# Patient Record
Sex: FEMALE | Race: WHITE | NOT HISPANIC OR LATINO | URBAN - METROPOLITAN AREA
[De-identification: names, ages, dates, MRNs, and addresses within clinical notes are randomized per-mention and may not be internally consistent; named-entity substitution may affect disease eponyms.]

---

## 2024-02-16 ENCOUNTER — APPOINTMENT (OUTPATIENT)
Dept: CARDIOLOGY | Facility: HOSPITAL | Age: 59
End: 2024-02-16
Payer: COMMERCIAL

## 2024-02-16 ENCOUNTER — HOSPITAL ENCOUNTER (EMERGENCY)
Facility: HOSPITAL | Age: 59
Discharge: HOME | End: 2024-02-16
Attending: EMERGENCY MEDICINE
Payer: COMMERCIAL

## 2024-02-16 VITALS
BODY MASS INDEX: 38.98 KG/M2 | OXYGEN SATURATION: 96 % | WEIGHT: 220.02 LBS | TEMPERATURE: 98.1 F | RESPIRATION RATE: 11 BRPM | DIASTOLIC BLOOD PRESSURE: 90 MMHG | HEART RATE: 55 BPM | SYSTOLIC BLOOD PRESSURE: 160 MMHG | HEIGHT: 63 IN

## 2024-02-16 DIAGNOSIS — R07.9 CHEST PAIN, UNSPECIFIED TYPE: Primary | ICD-10-CM

## 2024-02-16 DIAGNOSIS — I10 HYPERTENSION, UNSPECIFIED TYPE: ICD-10-CM

## 2024-02-16 LAB
ALBUMIN SERPL-MCNC: 3.9 G/DL (ref 3.5–5)
ALP BLD-CCNC: 153 U/L (ref 35–125)
ALT SERPL-CCNC: 17 U/L (ref 5–40)
ANION GAP SERPL CALC-SCNC: 8 MMOL/L
AST SERPL-CCNC: 14 U/L (ref 5–40)
BASOPHILS # BLD AUTO: 0.04 X10*3/UL (ref 0–0.1)
BASOPHILS NFR BLD AUTO: 0.7 %
BILIRUB SERPL-MCNC: <0.2 MG/DL (ref 0.1–1.2)
BUN SERPL-MCNC: 12 MG/DL (ref 8–25)
CALCIUM SERPL-MCNC: 9.1 MG/DL (ref 8.5–10.4)
CHLORIDE SERPL-SCNC: 107 MMOL/L (ref 97–107)
CK SERPL-CCNC: 58 U/L (ref 24–195)
CO2 SERPL-SCNC: 25 MMOL/L (ref 24–31)
CREAT SERPL-MCNC: 0.7 MG/DL (ref 0.4–1.6)
EGFRCR SERPLBLD CKD-EPI 2021: >90 ML/MIN/1.73M*2
EOSINOPHIL # BLD AUTO: 0.2 X10*3/UL (ref 0–0.7)
EOSINOPHIL NFR BLD AUTO: 3.6 %
ERYTHROCYTE [DISTWIDTH] IN BLOOD BY AUTOMATED COUNT: 11.9 % (ref 11.5–14.5)
FLUAV RNA RESP QL NAA+PROBE: NOT DETECTED
FLUBV RNA RESP QL NAA+PROBE: NOT DETECTED
GLUCOSE SERPL-MCNC: 112 MG/DL (ref 65–99)
HCT VFR BLD AUTO: 39.7 % (ref 36–46)
HGB BLD-MCNC: 13.4 G/DL (ref 12–16)
IMM GRANULOCYTES # BLD AUTO: 0.01 X10*3/UL (ref 0–0.7)
IMM GRANULOCYTES NFR BLD AUTO: 0.2 % (ref 0–0.9)
LIPASE SERPL-CCNC: 28 U/L (ref 16–63)
LYMPHOCYTES # BLD AUTO: 2.53 X10*3/UL (ref 1.2–4.8)
LYMPHOCYTES NFR BLD AUTO: 45.3 %
MCH RBC QN AUTO: 31.1 PG (ref 26–34)
MCHC RBC AUTO-ENTMCNC: 33.8 G/DL (ref 32–36)
MCV RBC AUTO: 92 FL (ref 80–100)
MONOCYTES # BLD AUTO: 0.44 X10*3/UL (ref 0.1–1)
MONOCYTES NFR BLD AUTO: 7.9 %
NEUTROPHILS # BLD AUTO: 2.36 X10*3/UL (ref 1.2–7.7)
NEUTROPHILS NFR BLD AUTO: 42.3 %
NRBC BLD-RTO: 0 /100 WBCS (ref 0–0)
PLATELET # BLD AUTO: 215 X10*3/UL (ref 150–450)
POTASSIUM SERPL-SCNC: 4.1 MMOL/L (ref 3.4–5.1)
PROT SERPL-MCNC: 6.8 G/DL (ref 5.9–7.9)
RBC # BLD AUTO: 4.31 X10*6/UL (ref 4–5.2)
SARS-COV-2 RNA RESP QL NAA+PROBE: NOT DETECTED
SODIUM SERPL-SCNC: 140 MMOL/L (ref 133–145)
TROPONIN T SERPL-MCNC: 7 NG/L
TROPONIN T SERPL-MCNC: 7 NG/L
WBC # BLD AUTO: 5.6 X10*3/UL (ref 4.4–11.3)

## 2024-02-16 PROCEDURE — 85025 COMPLETE CBC W/AUTO DIFF WBC: CPT | Performed by: EMERGENCY MEDICINE

## 2024-02-16 PROCEDURE — 82550 ASSAY OF CK (CPK): CPT | Performed by: EMERGENCY MEDICINE

## 2024-02-16 PROCEDURE — 93005 ELECTROCARDIOGRAM TRACING: CPT

## 2024-02-16 PROCEDURE — 83690 ASSAY OF LIPASE: CPT | Performed by: EMERGENCY MEDICINE

## 2024-02-16 PROCEDURE — 36415 COLL VENOUS BLD VENIPUNCTURE: CPT | Performed by: EMERGENCY MEDICINE

## 2024-02-16 PROCEDURE — 87636 SARSCOV2 & INF A&B AMP PRB: CPT | Performed by: EMERGENCY MEDICINE

## 2024-02-16 PROCEDURE — 80053 COMPREHEN METABOLIC PANEL: CPT | Performed by: EMERGENCY MEDICINE

## 2024-02-16 PROCEDURE — 99284 EMERGENCY DEPT VISIT MOD MDM: CPT | Mod: 25 | Performed by: EMERGENCY MEDICINE

## 2024-02-16 PROCEDURE — 84484 ASSAY OF TROPONIN QUANT: CPT | Performed by: EMERGENCY MEDICINE

## 2024-02-16 PROCEDURE — 99283 EMERGENCY DEPT VISIT LOW MDM: CPT | Mod: 25

## 2024-02-16 ASSESSMENT — PAIN DESCRIPTION - PROGRESSION: CLINICAL_PROGRESSION: NOT CHANGED

## 2024-02-16 ASSESSMENT — HEART SCORE
ECG: NORMAL
RISK FACTORS: 1-2 RISK FACTORS
HEART SCORE: 2
AGE: 45-64
TROPONIN: LESS THAN OR EQUAL TO NORMAL LIMIT
HISTORY: SLIGHTLY SUSPICIOUS

## 2024-02-16 ASSESSMENT — PAIN DESCRIPTION - ONSET: ONSET: SUDDEN

## 2024-02-16 ASSESSMENT — PAIN DESCRIPTION - DESCRIPTORS
DESCRIPTORS: ACHING;POUNDING;PRESSURE
DESCRIPTORS: PRESSURE
DESCRIPTORS_2: PRESSURE;TIGHTNESS

## 2024-02-16 ASSESSMENT — COLUMBIA-SUICIDE SEVERITY RATING SCALE - C-SSRS
1. IN THE PAST MONTH, HAVE YOU WISHED YOU WERE DEAD OR WISHED YOU COULD GO TO SLEEP AND NOT WAKE UP?: NO
6. HAVE YOU EVER DONE ANYTHING, STARTED TO DO ANYTHING, OR PREPARED TO DO ANYTHING TO END YOUR LIFE?: NO
2. HAVE YOU ACTUALLY HAD ANY THOUGHTS OF KILLING YOURSELF?: NO

## 2024-02-16 ASSESSMENT — PAIN DESCRIPTION - LOCATION
LOCATION: HEAD
LOCATION_2: CHEST

## 2024-02-16 ASSESSMENT — PAIN SCALES - GENERAL
PAINLEVEL_OUTOF10: 4
PAINLEVEL_OUTOF10: 9

## 2024-02-16 ASSESSMENT — PAIN - FUNCTIONAL ASSESSMENT: PAIN_FUNCTIONAL_ASSESSMENT: 0-10

## 2024-02-16 ASSESSMENT — PAIN DESCRIPTION - PAIN TYPE: TYPE: ACUTE PAIN

## 2024-02-16 ASSESSMENT — PAIN DESCRIPTION - FREQUENCY: FREQUENCY: CONSTANT/CONTINUOUS

## 2024-02-16 NOTE — DISCHARGE INSTRUCTIONS
Follow-up with your primary care physician as soon as possible.  Return to the ER immediately for new or worsening symptoms

## 2024-02-16 NOTE — ED PROVIDER NOTES
HPI   Chief Complaint   Patient presents with    Chest Pain     Pt has been achy for the last couple days. Pt had a doctor appt yesterday and her BP was high. Pt was laying in bed at home and couldn't sleep. Pt started to have some chest tightness that radiates to her left arm as well as nausea , headache, and back pain.       HPI       66-year-old female with high blood pressure and left shoulder and arm pain.  She states she had a hard time sleeping all night.  Then she felt like her heart was pounding in her chest and developed a headache.  She checked her blood pressure and it was 190/100.  She lie down to rest and that she got better.  Then about 1/2-hour later she felt aching in her left shoulder down to her left arm.  She reports that she has had diffuse achiness for multiple weeks.  Earlier yesterday she went to see her PCP who diagnosed her with spondylolisthesis.  No new medications.  Headache has improved.  No pain across the chest.  No shortness of breath.  She does smoke.  No prior cardiac procedures.  Does have family history.             No data recorded HEART Score: 2                   Patient History   Past Medical History:   Diagnosis Date    Acute upper respiratory infection, unspecified 10/01/2018    Acute URI    Encounter for gynecological examination (general) (routine) without abnormal findings 04/03/2017    Well female exam with routine gynecological exam    Encounter for gynecological examination (general) (routine) without abnormal findings     Encounter for routine pelvic examination    Other conditions influencing health status     MRI Head FLAIR    Other conditions influencing health status     Doppler US Of Artery: Carotid    Personal history of other diseases of the nervous system and sense organs 12/08/2017    History of acute otitis media    Personal history of other diseases of the nervous system and sense organs 10/02/2018    History of acute otitis externa    Personal history of  other medical treatment 2017    History of screening mammography    Unspecified convulsions (CMS/HCC)     Seizures    Varicella without complication     Varicella without complication     Past Surgical History:   Procedure Laterality Date     SECTION, CLASSIC  2013     Section    CHOLECYSTECTOMY  2013    Cholecystectomy    TOTAL HIP ARTHROPLASTY  2015    Total Hip Replacement    TUBAL LIGATION  04/10/2015    Tubal Ligation     No family history on file.  Social History     Tobacco Use    Smoking status: Not on file    Smokeless tobacco: Not on file   Substance Use Topics    Alcohol use: Not on file    Drug use: Not on file       Physical Exam   ED Triage Vitals [24 0217]   Temperature Heart Rate Respirations BP   36.7 °C (98.1 °F) 60 16 (!) 156/92      Pulse Ox Temp Source Heart Rate Source Patient Position   95 % Temporal Monitor Sitting      BP Location FiO2 (%)     Right arm --       Physical Exam    Gen:  Well nourished, no acute distress  Head: Normocephalic, atraumatic  Eyes: PERRL, EOMI, conjunctiva clear  ENT: External ears and nose normal, OP clear, Mucosa moist  Neck: Supple, no tenderness  Chest: No tenderness, no crepitus  CV: Regular rate and rhythm, no Murmur  Lungs: Clear bilaterally, no distress  Abd: No tenderness, no rebound or guarding  Extremities: FROM, no edema  Neuro: Cranial nerves intact, moving all 4 extremities, A&O x 4  Psych: Appropriate behavior and affect  Back: No focal tenderness, no CVA tenderness  Skin: No rashes or lesions noted    ED Course & MDM     EKG was ordered by me and interpreted by me.  Normal sinus rhythm rate of 61.  Normal TN QT intervals.  Normal QRS.  No acute ST or T wave changes  ED Course as of 24 0604    The following diagnostic tests were ordered by me and interpreted by me.  Flu and COVID-negative.  Initial troponin 7 which is negative.  Initial CK 58.  Lipase negative.  CBC within  normal notes.  Chemistry panel, LFTs within norm limits. [AK]      ED Course User Index  [AK] Kenrick Herrera MD         Diagnoses as of 02/16/24 0604   Chest pain, unspecified type   Hypertension, unspecified type   Patient presented with some pain in the shoulder and arm.  It sounds like she was up all night possibly due to the spondylolysis was recent diagnosed with.  Then she felt like her heart was pounding checked her blood pressure was high.  She checked it again and it was still elevated.  Then she felt soreness in her shoulder so she came to the ER for evaluation.  She has no prior cardiac disease.  Heart score is a 2 indicating a low risk.  Clinically this does not sound cardiac in nature.  I like she was up either due to anxiety or being uncomfortable then her blood pressure was elevated.  It sounds like these joint pains have been chronic for some time.  Has had no difficulties exertion.  She resting comfortably and pain-free here.  Plan to repeat troponin and if negative can discharge home      Repeat troponin returns negative.  She otherwise is stable.  No signs of acute pulmonary embolism, pneumothorax, pneumonia or acute cord syndrome.  Plan discharge home and follow-up with PCP  Medical Decision Making      Procedure  Procedures     Kenrick Herrera MD  02/16/24 0604

## 2024-04-01 LAB
ATRIAL RATE: 61 BPM
P AXIS: 49 DEGREES
P OFFSET: 196 MS
P ONSET: 139 MS
PR INTERVAL: 164 MS
Q ONSET: 221 MS
QRS COUNT: 10 BEATS
QRS DURATION: 82 MS
QT INTERVAL: 414 MS
QTC CALCULATION(BAZETT): 416 MS
QTC FREDERICIA: 416 MS
R AXIS: 52 DEGREES
T AXIS: 59 DEGREES
T OFFSET: 428 MS
VENTRICULAR RATE: 61 BPM

## 2024-05-10 ENCOUNTER — OFFICE VISIT (OUTPATIENT)
Dept: NEUROLOGY | Facility: CLINIC | Age: 59
End: 2024-05-10
Payer: COMMERCIAL

## 2024-05-10 DIAGNOSIS — R56.9 SEIZURE (MULTI): Primary | ICD-10-CM

## 2024-05-10 PROCEDURE — 99214 OFFICE O/P EST MOD 30 MIN: CPT | Performed by: PSYCHIATRY & NEUROLOGY

## 2024-05-10 RX ORDER — DIVALPROEX SODIUM 500 MG/1
500 TABLET, FILM COATED, EXTENDED RELEASE ORAL DAILY
COMMUNITY
End: 2024-05-10 | Stop reason: SDUPTHER

## 2024-05-10 RX ORDER — MULTIVITAMIN WITH IRON
1 TABLET ORAL DAILY
COMMUNITY
Start: 2022-05-12

## 2024-05-10 RX ORDER — CELECOXIB 200 MG/1
200 CAPSULE ORAL
COMMUNITY

## 2024-05-10 RX ORDER — DIVALPROEX SODIUM 500 MG/1
500 TABLET, FILM COATED, EXTENDED RELEASE ORAL DAILY
Qty: 30 TABLET | Refills: 11 | Status: SHIPPED | OUTPATIENT
Start: 2024-05-10

## 2024-05-10 RX ORDER — LOSARTAN POTASSIUM AND HYDROCHLOROTHIAZIDE 12.5; 1 MG/1; MG/1
1 TABLET ORAL DAILY
COMMUNITY

## 2024-05-10 RX ORDER — VIT C/E/ZN/COPPR/LUTEIN/ZEAXAN 250MG-90MG
25 CAPSULE ORAL DAILY
COMMUNITY

## 2024-05-10 RX ORDER — TRAZODONE HYDROCHLORIDE 50 MG/1
50 TABLET ORAL NIGHTLY
COMMUNITY

## 2024-05-10 RX ORDER — METHOCARBAMOL 500 MG/1
500 TABLET, FILM COATED ORAL DAILY PRN
COMMUNITY

## 2024-05-10 RX ORDER — LANOLIN ALCOHOL/MO/W.PET/CERES
1000 CREAM (GRAM) TOPICAL EVERY 24 HOURS
COMMUNITY

## 2024-05-10 NOTE — PROGRESS NOTES
Chief complaint:    Seizure disorder    History of Present Illness:   Bk has had a good year. She is sleeping more and better with trazodone. She is good about using CPAP at night. She has decreased pepsi consumption from at least 6 cans a day to two cans, before 2 pm. She is compliant with Depakote and has not had a seizure in years. 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 She has lost a few pounds.      Physical examination:  She is a pleasant, healthy-appearing woman     Neurologic Exam     Mental Status   Her speech was clear, fluent and appropriate.      Cranial Nerves     CN II   Visual fields full to confrontation.     CN III, IV, VI   Extraocular motions are normal.     CN VII   Facial expression full, symmetric.     Motor Exam     Strength   Right iliopsoas: 5/5  Left iliopsoas: 5/5  Right anterior tibial: 5/5  Left anterior tibial: 5/5  No abnormal movements.  Finger tapping was brisk bilaterally.     Sensory Exam   Light touch normal.     Gait, Coordination, and Reflexes     Reflexes   Right brachioradialis: 1+  Left brachioradialis: 1+  Right biceps: 1+  Left biceps: 1+  Right triceps: 1+  Left triceps: 1+  Right patellar: 2+  Left patellar: 2+  Right achilles: 0  Left achilles: 0  Right plantar: normal  Left plantar: normal  She limped slightly on her right side due to hip pain after hip replacement but she also had a hint of steppage gait on the left. She was able to walk on her heels or toes and perform a tandem gait cautiously.          1. Seizure (Multi)  divalproex (Depakote ER) 500 mg 24 hr tablet             No orders of the defined types were placed in this encounter.         Impression and Plan:  Mayuri continues to remain seizure-free on Depakote  mg a day. Her last seizure was over 5 years ago. She was  unable to tolerate a higher dose of Depakote due to sedation. Recent CMP and CBC with diff were  reviewed and were notable for elevated glucose. She has cut back on Pepsi consumption, and i encouraged her to cut back further and walk on her days off for exercise.     She will ask Dr. Crabtree if he can continue to prescribe Depakote  mg a day after I retire at the end of September. If not, she will consult a local  neurologist on a list I gave her.     Janine Patton MD

## 2024-05-10 NOTE — PATIENT INSTRUCTIONS
You continue to do well on EXTENDED RELEASE Depakote (ER) 500 mg a day. Please continue this medication to prevent seizures. Your recent blood work looked fine although your sugar is mildly elevated, putting you at ailin risk for diabetes. Please continue to cut back on Pepsi and continue to work on losing weight. I will retire at the end of September, so please ask Dr. Crabtree if he will refill Depakote ER in the future. If not please consult a local neurologist.

## 2024-12-30 ENCOUNTER — HOSPITAL ENCOUNTER (OUTPATIENT)
Dept: RADIOLOGY | Facility: HOSPITAL | Age: 59
Discharge: HOME | End: 2024-12-30
Payer: COMMERCIAL

## 2024-12-30 VITALS — BODY MASS INDEX: 38.98 KG/M2 | HEIGHT: 63 IN | WEIGHT: 220 LBS

## 2024-12-30 DIAGNOSIS — Z12.31 SCREENING MAMMOGRAM FOR BREAST CANCER: ICD-10-CM

## 2024-12-30 PROBLEM — D12.6 TUBULAR ADENOMA OF COLON: Status: ACTIVE | Noted: 2024-12-30

## 2024-12-30 PROBLEM — N89.8 VAGINAL DRYNESS: Status: ACTIVE | Noted: 2024-12-30

## 2024-12-30 PROBLEM — H10.30 CONJUNCTIVITIS, ACUTE: Status: RESOLVED | Noted: 2024-12-30 | Resolved: 2024-12-30

## 2024-12-30 PROBLEM — J11.1 INFLUENZA-LIKE ILLNESS: Status: RESOLVED | Noted: 2024-12-30 | Resolved: 2024-12-30

## 2024-12-30 PROBLEM — Z78.0 POSTMENOPAUSAL STATE: Status: ACTIVE | Noted: 2024-12-30

## 2024-12-30 PROBLEM — E66.9 OBESITY: Status: ACTIVE | Noted: 2024-12-30

## 2024-12-30 PROBLEM — M19.90 ARTHRITIS: Status: ACTIVE | Noted: 2024-12-30

## 2024-12-30 PROBLEM — N94.10 DYSPAREUNIA IN FEMALE: Status: ACTIVE | Noted: 2024-12-30

## 2024-12-30 PROBLEM — M16.10 HIP ARTHRITIS: Status: ACTIVE | Noted: 2024-12-30

## 2024-12-30 PROBLEM — I10 BENIGN ESSENTIAL HYPERTENSION: Status: ACTIVE | Noted: 2024-12-30

## 2024-12-30 PROBLEM — J34.0 NASAL ABSCESS: Status: ACTIVE | Noted: 2024-12-30

## 2024-12-30 PROBLEM — H60.90 OTITIS EXTERNA: Status: RESOLVED | Noted: 2024-12-30 | Resolved: 2024-12-30

## 2024-12-30 PROBLEM — G47.00 INSOMNIA: Status: ACTIVE | Noted: 2024-12-30

## 2024-12-30 PROBLEM — B01.9 VARICELLA: Status: RESOLVED | Noted: 2024-12-30 | Resolved: 2024-12-30

## 2024-12-30 PROBLEM — L70.9 ACNE: Status: ACTIVE | Noted: 2024-12-30

## 2024-12-30 PROBLEM — R56.9 GENERALIZED CONVULSIVE SEIZURE (MULTI): Status: ACTIVE | Noted: 2024-12-30

## 2024-12-30 PROBLEM — J01.00 ACUTE MAXILLARY SINUSITIS: Status: RESOLVED | Noted: 2024-12-30 | Resolved: 2024-12-30

## 2024-12-30 PROBLEM — H60.339 SWIMMER'S EAR: Status: RESOLVED | Noted: 2024-12-30 | Resolved: 2024-12-30

## 2024-12-30 PROBLEM — I82.90 VENOUS THROMBOSIS: Status: RESOLVED | Noted: 2024-12-30 | Resolved: 2024-12-30

## 2024-12-30 PROBLEM — S63.91XA SPRAIN OF UNSPECIFIED PART OF RIGHT WRIST AND HAND, INITIAL ENCOUNTER: Status: RESOLVED | Noted: 2022-01-26 | Resolved: 2024-12-30

## 2024-12-30 PROBLEM — R56.9 SEIZURE (MULTI): Status: ACTIVE | Noted: 2024-12-30

## 2024-12-30 PROCEDURE — 77067 SCR MAMMO BI INCL CAD: CPT

## 2024-12-30 PROCEDURE — 77063 BREAST TOMOSYNTHESIS BI: CPT | Performed by: RADIOLOGY

## 2024-12-30 PROCEDURE — 77067 SCR MAMMO BI INCL CAD: CPT | Performed by: RADIOLOGY

## 2025-01-03 ENCOUNTER — APPOINTMENT (OUTPATIENT)
Dept: PRIMARY CARE | Facility: CLINIC | Age: 60
End: 2025-01-03
Payer: COMMERCIAL

## 2025-01-07 ENCOUNTER — APPOINTMENT (OUTPATIENT)
Dept: PRIMARY CARE | Facility: CLINIC | Age: 60
End: 2025-01-07
Payer: COMMERCIAL

## 2025-01-10 ENCOUNTER — APPOINTMENT (OUTPATIENT)
Dept: PRIMARY CARE | Facility: CLINIC | Age: 60
End: 2025-01-10
Payer: COMMERCIAL

## 2025-01-14 ENCOUNTER — OFFICE VISIT (OUTPATIENT)
Dept: PRIMARY CARE | Facility: CLINIC | Age: 60
End: 2025-01-14
Payer: COMMERCIAL

## 2025-01-14 VITALS
TEMPERATURE: 97.8 F | HEIGHT: 63 IN | BODY MASS INDEX: 39.87 KG/M2 | DIASTOLIC BLOOD PRESSURE: 68 MMHG | WEIGHT: 225 LBS | OXYGEN SATURATION: 97 % | HEART RATE: 58 BPM | SYSTOLIC BLOOD PRESSURE: 105 MMHG

## 2025-01-14 DIAGNOSIS — Z76.89 ENCOUNTER TO ESTABLISH CARE: Primary | ICD-10-CM

## 2025-01-14 DIAGNOSIS — R56.9 SEIZURE (MULTI): ICD-10-CM

## 2025-01-14 DIAGNOSIS — E66.9 OBESITY (BMI 30-39.9): ICD-10-CM

## 2025-01-14 DIAGNOSIS — G47.30 SLEEP APNEA, UNSPECIFIED TYPE: ICD-10-CM

## 2025-01-14 DIAGNOSIS — R56.9 GENERALIZED CONVULSIVE SEIZURE (MULTI): ICD-10-CM

## 2025-01-14 DIAGNOSIS — D12.6 TUBULAR ADENOMA OF COLON: ICD-10-CM

## 2025-01-14 DIAGNOSIS — M19.90 ARTHRITIS: ICD-10-CM

## 2025-01-14 PROBLEM — L70.9 ACNE: Status: RESOLVED | Noted: 2024-12-30 | Resolved: 2025-01-14

## 2025-01-14 PROBLEM — J34.0 NASAL ABSCESS: Status: RESOLVED | Noted: 2024-12-30 | Resolved: 2025-01-14

## 2025-01-14 PROBLEM — N89.8 VAGINAL DRYNESS: Status: RESOLVED | Noted: 2024-12-30 | Resolved: 2025-01-14

## 2025-01-14 PROCEDURE — 3008F BODY MASS INDEX DOCD: CPT | Performed by: NURSE PRACTITIONER

## 2025-01-14 PROCEDURE — 3078F DIAST BP <80 MM HG: CPT | Performed by: NURSE PRACTITIONER

## 2025-01-14 PROCEDURE — 99204 OFFICE O/P NEW MOD 45 MIN: CPT | Performed by: NURSE PRACTITIONER

## 2025-01-14 PROCEDURE — 1036F TOBACCO NON-USER: CPT | Performed by: NURSE PRACTITIONER

## 2025-01-14 PROCEDURE — 3074F SYST BP LT 130 MM HG: CPT | Performed by: NURSE PRACTITIONER

## 2025-01-14 RX ORDER — ACETAMINOPHEN 325 MG/1
TABLET ORAL EVERY 6 HOURS PRN
COMMUNITY

## 2025-01-14 RX ORDER — METHOCARBAMOL 500 MG/1
500 TABLET, FILM COATED ORAL DAILY PRN
Qty: 30 TABLET | Refills: 2 | Status: SHIPPED | OUTPATIENT
Start: 2025-01-14

## 2025-01-14 ASSESSMENT — ENCOUNTER SYMPTOMS
ARTHRALGIAS: 1
CONSTITUTIONAL NEGATIVE: 1
LOSS OF SENSATION IN FEET: 0
DEPRESSION: 0
GASTROINTESTINAL NEGATIVE: 1
CARDIOVASCULAR NEGATIVE: 1
PSYCHIATRIC NEGATIVE: 1
OCCASIONAL FEELINGS OF UNSTEADINESS: 0
RESPIRATORY NEGATIVE: 1

## 2025-01-14 ASSESSMENT — PAIN SCALES - GENERAL: PAINLEVEL_OUTOF10: 1

## 2025-01-14 ASSESSMENT — PATIENT HEALTH QUESTIONNAIRE - PHQ9
1. LITTLE INTEREST OR PLEASURE IN DOING THINGS: NOT AT ALL
SUM OF ALL RESPONSES TO PHQ9 QUESTIONS 1 AND 2: 0
2. FEELING DOWN, DEPRESSED OR HOPELESS: NOT AT ALL

## 2025-01-14 NOTE — ASSESSMENT & PLAN NOTE
Managed by neurology  Last seen on 5/10/2024 Dr. Janine quesada retired  Managed with depakot 500mg daily  Last seizure 6-7 years ago

## 2025-01-14 NOTE — ASSESSMENT & PLAN NOTE
Managed with robaxin 500mg prn  Managed with celebrex 200mg daily    Orders:    methocarbamol (Robaxin) 500 mg tablet; Take 1 tablet (500 mg) by mouth once daily as needed for muscle spasms.

## 2025-01-14 NOTE — PROGRESS NOTES
[facility]  PROGRESS NOTE      Mayuri Cervantes is a 59 y.o. female that is presenting today to establish care with new provider.  She last saw a provider in June Dr. Crabtree who has retired.  She does need a refill on robaxin.  Would like a referral to sleep medicine has VEENA on cpap and needs new supplies    Assessment/Plan   Assessment & Plan  Encounter to establish care  Medications and problem list reconciled today with the patient    CARE GAPS  Colonoscopy 4/19/2019 repeat in 5 years  Mammogram 12/30/2024  Declined flu and covid vaccines       Tubular adenoma of colon  Last colonoscopy 4/19/2019 repeat in 5 years   Needs to schedule    Arthritis  Managed with robaxin 500mg prn  Managed with celebrex 200mg daily    Orders:    methocarbamol (Robaxin) 500 mg tablet; Take 1 tablet (500 mg) by mouth once daily as needed for muscle spasms.    Seizure (Multi)  Managed by neurology  Last seen on 5/10/2024 Dr. Janine quesada retired  Managed with depakot 500mg daily  Last seizure 6-7 years ago    Obesity (BMI 30-39.9)    Orders:    Lipid Panel; Future    Hemoglobin A1C; Future    Thyroid Stimulating Hormone; Future    Sleep apnea, unspecified type    Orders:    Referral to Adult Sleep Medicine; Future      Subjective   HPI  Review of Systems   Constitutional: Negative.    Respiratory: Negative.     Cardiovascular: Negative.    Gastrointestinal: Negative.    Genitourinary: Negative.    Musculoskeletal:  Positive for arthralgias.   Skin: Negative.    Psychiatric/Behavioral: Negative.        Objective   Vitals:    01/14/25 1106   BP: 105/68   Pulse: 58   Temp: 36.6 °C (97.8 °F)   SpO2: 97%      Body mass index is 39.86 kg/m².  Physical Exam  Vitals reviewed.   Constitutional:       Appearance: Normal appearance.   Cardiovascular:      Rate and Rhythm: Normal rate and regular rhythm.      Heart sounds: Normal heart sounds.   Pulmonary:      Effort: Pulmonary effort is normal.      Breath  "sounds: Normal breath sounds.   Abdominal:      General: Bowel sounds are normal.      Palpations: Abdomen is soft.   Skin:     General: Skin is warm and dry.   Neurological:      General: No focal deficit present.      Mental Status: She is alert and oriented to person, place, and time.   Psychiatric:         Mood and Affect: Mood normal.         Behavior: Behavior normal.         Thought Content: Thought content normal.         Judgment: Judgment normal.       Vitals:    01/14/25 1106   BP: 105/68   Pulse: 58   Temp: 36.6 °C (97.8 °F)   SpO2: 97%       Diagnostic Results   Lab Results   Component Value Date    GLUCOSE 112 (H) 02/16/2024    CALCIUM 9.1 02/16/2024     02/16/2024    K 4.1 02/16/2024    CO2 25 02/16/2024     02/16/2024    BUN 12 02/16/2024    CREATININE 0.70 02/16/2024     Lab Results   Component Value Date    ALT 17 02/16/2024    AST 14 02/16/2024    ALKPHOS 153 (H) 02/16/2024    BILITOT <0.2 02/16/2024     Lab Results   Component Value Date    WBC 5.6 02/16/2024    HGB 13.4 02/16/2024    HCT 39.7 02/16/2024    MCV 92 02/16/2024     02/16/2024     Lab Results   Component Value Date    CHOL 178 12/03/2018     Lab Results   Component Value Date    HDL 46.7 12/03/2018     No results found for: \"LDLCALC\"  Lab Results   Component Value Date    TRIG 139 12/03/2018     No components found for: \"CHOLHDL\"  No results found for: \"HGBA1C\"  Other labs not included in the list above were reviewed either before or during this encounter.    History    Past Medical History:   Diagnosis Date    Acne 12/30/2024    Acute maxillary sinusitis 12/30/2024    Acute upper respiratory infection, unspecified 10/01/2018    Acute URI    Conjunctivitis, acute 12/30/2024    Encounter for gynecological examination (general) (routine) without abnormal findings 04/03/2017    Well female exam with routine gynecological exam    Encounter for gynecological examination (general) (routine) without abnormal findings     " Encounter for routine pelvic examination    Hypertension     Influenza-like illness 2024    Nasal abscess 2024    Other conditions influencing health status     MRI Head FLAIR    Other conditions influencing health status     Doppler US Of Artery: Carotid    Personal history of other diseases of the nervous system and sense organs 2017    History of acute otitis media    Personal history of other diseases of the nervous system and sense organs 10/02/2018    History of acute otitis externa    Personal history of other medical treatment 2017    History of screening mammography    Sprain of unspecified part of right wrist and hand, initial encounter 2022    Swimmer's ear 2024    Unspecified convulsions (Multi)     Seizures    Vaginal dryness 2024    Varicella 2024    Varicella without complication     Varicella without complication    Venous thrombosis 2024     Past Surgical History:   Procedure Laterality Date     SECTION, CLASSIC  2013     Section    CHOLECYSTECTOMY  2013    Cholecystectomy    TOTAL HIP ARTHROPLASTY  2015    Total Hip Replacement    TUBAL LIGATION  04/10/2015    Tubal Ligation     Family History   Problem Relation Name Age of Onset    Lung cancer Mother      Diabetes Mother      Heart disease Father      Breast cancer Maternal Grandmother       Social History     Socioeconomic History    Marital status:      Spouse name: Not on file    Number of children: Not on file    Years of education: Not on file    Highest education level: Not on file   Occupational History    Not on file   Tobacco Use    Smoking status: Former     Current packs/day: 0.00     Types: Cigarettes     Quit date:      Years since quitting: 10.0     Passive exposure: Past    Smokeless tobacco: Never   Vaping Use    Vaping status: Some Days    Substances: Nicotine    Devices: Disposable    Passive vaping exposure: Yes   Substance and  Sexual Activity    Alcohol use: Yes     Comment: occasional    Drug use: Never    Sexual activity: Not on file   Other Topics Concern    Not on file   Social History Narrative    Not on file     Social Drivers of Health     Financial Resource Strain: Not on file   Food Insecurity: Not on file   Transportation Needs: Not on file   Physical Activity: Not on file   Stress: Not on file   Social Connections: Not on file   Intimate Partner Violence: Not on file   Housing Stability: Not on file     No Known Allergies  Current Outpatient Medications on File Prior to Visit   Medication Sig Dispense Refill    acetaminophen (TylenoL) 325 mg tablet Take by mouth every 6 hours if needed for mild pain (1 - 3).      celecoxib (CeleBREX) 200 mg capsule Take 1 capsule (200 mg) by mouth once daily with breakfast.      cholecalciferol (Vitamin D3) 25 MCG (1000 UT) capsule Take 1 capsule (25 mcg) by mouth once daily.      divalproex (Depakote ER) 500 mg 24 hr tablet Take 1 tablet (500 mg) by mouth once daily. 30 tablet 11    losartan-hydrochlorothiazide (Hyzaar) 100-12.5 mg tablet Take 1 tablet by mouth once daily.      traZODone (Desyrel) 50 mg tablet Take 1 tablet (50 mg) by mouth once daily at bedtime.      [DISCONTINUED] methocarbamol (Robaxin) 500 mg tablet Take 1 tablet (500 mg) by mouth once daily as needed.       No current facility-administered medications on file prior to visit.     Immunization History   Administered Date(s) Administered    Flu vaccine, trivalent, preservative free, age 6 months and greater (Fluarix/Fluzone/Flulaval) 09/04/2020, 10/13/2021    Influenza, injectable, MDCK, quadrivalent 10/16/2019    Moderna SARS-CoV-2 Vaccination 01/13/2021, 02/12/2021, 01/18/2022    Tdap vaccine, age 7 year and older (BOOSTRIX, ADACEL) 07/16/2012, 08/18/2018     Patient's medical history was reviewed and updated either before or during this encounter.       Tracy Galeano, APRN-CNP

## 2025-02-26 ENCOUNTER — APPOINTMENT (OUTPATIENT)
Dept: PODIATRY | Facility: CLINIC | Age: 60
End: 2025-02-26
Payer: COMMERCIAL

## 2025-02-26 ENCOUNTER — OFFICE VISIT (OUTPATIENT)
Dept: SLEEP MEDICINE | Facility: CLINIC | Age: 60
End: 2025-02-26
Payer: COMMERCIAL

## 2025-02-26 VITALS
HEIGHT: 63 IN | HEART RATE: 96 BPM | WEIGHT: 220 LBS | BODY MASS INDEX: 38.98 KG/M2 | SYSTOLIC BLOOD PRESSURE: 106 MMHG | DIASTOLIC BLOOD PRESSURE: 70 MMHG | OXYGEN SATURATION: 99 %

## 2025-02-26 DIAGNOSIS — I10 BENIGN ESSENTIAL HYPERTENSION: Primary | ICD-10-CM

## 2025-02-26 DIAGNOSIS — G47.30 SLEEP APNEA, UNSPECIFIED TYPE: ICD-10-CM

## 2025-02-26 DIAGNOSIS — F51.01 PRIMARY INSOMNIA: ICD-10-CM

## 2025-02-26 DIAGNOSIS — G47.19 EXCESSIVE DAYTIME SLEEPINESS: ICD-10-CM

## 2025-02-26 DIAGNOSIS — G47.21 CIRCADIAN RHYTHM SLEEP DISORDER, DELAYED SLEEP PHASE TYPE: ICD-10-CM

## 2025-02-26 PROCEDURE — 3074F SYST BP LT 130 MM HG: CPT | Performed by: PHYSICIAN ASSISTANT

## 2025-02-26 PROCEDURE — 1036F TOBACCO NON-USER: CPT | Performed by: PHYSICIAN ASSISTANT

## 2025-02-26 PROCEDURE — G2211 COMPLEX E/M VISIT ADD ON: HCPCS | Performed by: PHYSICIAN ASSISTANT

## 2025-02-26 PROCEDURE — 3078F DIAST BP <80 MM HG: CPT | Performed by: PHYSICIAN ASSISTANT

## 2025-02-26 PROCEDURE — 3008F BODY MASS INDEX DOCD: CPT | Performed by: PHYSICIAN ASSISTANT

## 2025-02-26 PROCEDURE — 99204 OFFICE O/P NEW MOD 45 MIN: CPT | Performed by: PHYSICIAN ASSISTANT

## 2025-02-26 ASSESSMENT — SLEEP AND FATIGUE QUESTIONNAIRES
ESS-CHAD TOTAL SCORE: 5
HOW LIKELY ARE YOU TO NOD OFF OR FALL ASLEEP WHILE SITTING AND TALKING TO SOMEONE: WOULD NEVER DOZE
HOW LIKELY ARE YOU TO NOD OFF OR FALL ASLEEP WHILE WATCHING TV: MODERATE CHANCE OF DOZING
HOW LIKELY ARE YOU TO NOD OFF OR FALL ASLEEP WHILE LYING DOWN TO REST IN THE AFTERNOON WHEN CIRCUMSTANCES PERMIT: WOULD NEVER DOZE
HOW LIKELY ARE YOU TO NOD OFF OR FALL ASLEEP WHEN YOU ARE A PASSENGER IN A CAR FOR AN HOUR WITHOUT A BREAK: SLIGHT CHANCE OF DOZING
HOW LIKELY ARE YOU TO NOD OFF OR FALL ASLEEP WHILE SITTING QUIETLY AFTER LUNCH WITHOUT ALCOHOL: WOULD NEVER DOZE
SITING INACTIVE IN A PUBLIC PLACE LIKE A CLASS ROOM OR A MOVIE THEATER: WOULD NEVER DOZE
HOW LIKELY ARE YOU TO NOD OFF OR FALL ASLEEP IN A CAR, WHILE STOPPED FOR A FEW MINUTES IN TRAFFIC: WOULD NEVER DOZE
HOW LIKELY ARE YOU TO NOD OFF OR FALL ASLEEP WHILE SITTING AND READING: MODERATE CHANCE OF DOZING

## 2025-02-26 ASSESSMENT — PAIN SCALES - GENERAL: PAINLEVEL_OUTOF10: 0-NO PAIN

## 2025-02-26 NOTE — PROGRESS NOTES
[facility]  New Visit Note        HISTORY OF PRESENT ILLNESS     The patient's referring provider is: Tracy Galeano, BRE-CNP    HISTORY OF PRESENT ILLNESS   Mayuri Cervantes is a 59 y.o. female who presents to a [facility] for a sleep medicine evaluation with concerns of Snoring, Unrefreshing Sleep, Excessive Daytime Sleepiness, Sleep Apnea, and Pap Adherence Followup.     PAST SLEEP HISTORY    Patient has the following sleep-related diagnoses and sleep study results: 2021 sleep study was consistent with VEENA - she thinks severe with AHI of 80?    CURRENT HISTORY    She states she does well overall with CPAP.  Recently has been feeling more tired on CPAP.    Uses P30i mask and likes it.  Pressure feels low  Has some dry mouth, not using heated tube currently    Takes trazodone to help her fall asleep.  This does work well and she denies any side effects.  She did take melatonin previously but went up to a very high dose and did not find it helped.  She does feel she is a bit of a night owl although currently falls asleep early with her schedule.    PAP Adherence:  Durable Medical Equipment Company: [employer]  Pressure Range: 10 cm H2O Mask: p30i    A PAP adherence download was obtained and data was reviewed personally today in clinic. (see scanned document in EPIC)  % days >4 hours use: 97  Average use : 7 hr 36 min  95% leak : 41 L/min  AHI: 1.9     SLEEP RELATED-ROS:    SLEEP SCHEDULE WEEKDAYS/WORKDAYS  - Usual Bedtime: 9:30 PM  - Falls asleep around: 10:00 PM  - Wake time: 6:00 AM    SLEEP SCHEDULE WEEKENDS/NON-WORKDAYS  - Usual Bedtime: 11:00 PM  - Falls asleep around: 11:30 PM  - Wake time: 7:00 AM    NAPS: Does not nap.    AVERAGE SLEEP DURATION: 6-7 hours/day    PREFERRED SLEEP POSITION: Side    SLEEP INITIATION: Takes Trazodone to help fall asleep.    SLEEP MAINTENANCE: Wakes up occasionally during the night.    RECREATIONAL DRUG USE    SMOKING: Quit  "smoking in 2021.  ALCOHOL: Drinks alcohol occasionally.  CAFFEINE: Consumes one can of caffeinated beverage per day.  MARIJUANA: Never uses.    ESS: 5      PHYSICAL EXAM     VITAL SIGNS: /70   Pulse 96   Ht 1.6 m (5' 3\")   Wt 99.8 kg (220 lb)   SpO2 99%   BMI 38.97 kg/m²      PREVIOUS WEIGHTS:  Wt Readings from Last 3 Encounters:   02/26/25 99.8 kg (220 lb)   01/14/25 102 kg (225 lb)   12/30/24 99.8 kg (220 lb)       PHYSICAL EXAM: GENERAL: alert oriented x 3 pleasant and cooperative no acute distress  MODIFIED MALLAMPATI SCORE: 2+  LATERAL PHARYNGEAL WALL: 2+  NECK EXAM: normal supple no adenopathy    RESULTS/DATA     No results found for: \"IRON\", \"TRANSFERRIN\", \"IRONSAT\", \"TIBC\", \"FERRITIN\"    ASSESSMENT/PLAN     Ms. Cervantes is a 59 y.o. female and was referred to the [facility] for the following issues:    OBSTRUCTIVE SLEEP APNEA / EDS  -Benefiting from CPAP  -Great compliance, VEENA well controlled per download  -PC 12 cm H2O  -provided sample mask (n30i) to try  -change to heated tubing to enable higher humidity  -supplies ordered    HYPERTENSION  BP Readings from Last 3 Encounters:   02/26/25 106/70   01/14/25 105/68   02/16/24 160/90      -Well controlled with current treatments     BMI>35  -Body mass index is 38.97 kg/m².  today  -With sufficient weight loss may no longer require treatment for VEENA    DELAYED SLEEP PHASE / INSOMNIA  -suspect more delayed phase vs insomnia  -taking trazodone 50-75 mg  -Recommend melatonin 1 mg 4-5 hours prior to intended bedtime  -Try increasing light exposure in the morning and decreasing light exposure in the afternoon     Followup 3 weeks after sleep study to review results   "

## 2025-02-26 NOTE — PATIENT INSTRUCTIONS
Thank you for coming to the Sleep Medicine Clinic today! Your sleep medicine provider today was: Barry Alva PA-C Below is a summary of your treatment plan, other important information, and our contact numbers:      TREATMENT PLAN     Call [phone]), option 3 to schedule your sleep study. When you have an appointment please call us back at [phone] to schedule a followup appointment 3-4 weeks after to review results.    I increased your pressure to 12 cm H2O.    I am giving you an n30i mask to try.    I recommend you start melatonin 1 mg 4-5 hours prior to intended bedtime. Increase the amount of light you get in the morning and avoid light as much as possible in the evening (at least 4 hours before you want to go to bed).     Obstructive Sleep Apnea (VEENA) is a sleep disorder where your upper airway muscles relax during sleep and the airway intermittently and repetitively narrows and collapses leading to partially blocked airway (hypopnea) or completely blocked airway (apnea) which, in turn, can disrupt breathing in sleep, lower oxygen levels while you sleep and cause night time wakings. Because both apnea and hypopnea may cause higher carbon dioxide or low oxygen levels, untreated VEENA can lead to heart arrhythmia, elevation of blood pressure, and make it harder for the body to consolidate memory and facilitate metabolism (leading to higher blood sugars at night). Frequent partial arousals occur during sleep resulting in sleep deprivation and daytime sleepiness. VEENA is associated with an increased risk of cardiovascular disease, stroke, hypertension, and insulin resistance. Moreover, untreated VEENA with excessive daytime sleepiness can increase the risk of motor vehicular accidents.    Some conservative strategies for VEENA regardless of VEENA severity are:   Positional therapy - Avoid sleeping on your back.   Healthy diet and regular exercise to optimize weight is highly encouraged.   Avoid alcohol  late in the evening and sedative-hypnotics as these substances can make sleep apnea worse.   Improve breathing through the nose with intranasal steroid spray, saline rinse, or antihistamines    Safety: Avoid driving vehicle and operating heavy equipment while sleepy. Drowsy driving may lead to life-threatening motor vehicle accidents. A person driving while sleepy is 5 times more likely to have an accident. If you feel sleepy, pull over and take a short power nap (sleep for less than 30 minutes). Otherwise, ask somebody to drive you.    Treatment options for sleep apnea include weight management, positional therapy, Positive Airway Therapy (PAP) therapy, oral appliance therapy, hypoglossal nerve stimulator (Inspire) and select airway surgeries.      OUR SLEEP TESTING LOCATIONS     Our team will contact you to schedule your sleep study, however, you can contact us as follow:  Main Phone Line (scheduling only): [phone]), option 3  Adult and Pediatric Locations  [facility] (6 years and older): [facility] - 4th floor ([address] [facility]) After hours line: [phone]  [facility] (Main campus: All ages): [facility], 6th floor. After hours line: [phone]   [place] (18 years and older): [address] [facility], 2nd floor   [place] (18 years and older): [address] [facility]; 4th floor  After hours line: [phone]  [facility] (18 years and older) at [place]: [address] [facility] [place][[address] 2]  After hours line: [phone]    [place] (5 years and older; younger considered on case-by-case basis): [address]; [address], [address]. Scheduling  After hours line: [phone]   [place] (6 years and older): [address]; [address]; [address]   [place] (6 years and older): [address] [facility] [address]  After hours line: [phone]   [place] (13 years and older) in [place]: [address], 2nd floor   "After hours line: [phone]   [place] (13 year and older): [address]  After hours line: [phone]      IMPORTANT PHONE NUMBERS     Sleep Medicine Clinic Fax: [phone]  Appointments (for Adult Sleep Clinic): [phone] - option 2  Appointments (For Sleep Studies): [phone] - option 3  Behavioral Sleep Medicine: [phone]    [place] ([facility]): [phone]  For clinical questions and refilling prescriptions: [phone]  Lisa Kaplan (For [place]): P: [phone]  F: [phone]       CONTACTING YOUR SLEEP MEDICINE PROVIDER     Send a message directly to your provider through \"My Chart\", which is the email service through your  Records Account: [email or web address]   Call [phone] and leave a message. One of the administrative assistants will forward the message to your sleep medicine provider through \"My Chart\" and/or email.     Your sleep medicine provider for this visit was: Barry Alva PA-C       "

## 2025-03-02 ENCOUNTER — PROCEDURE VISIT (OUTPATIENT)
Dept: SLEEP MEDICINE | Facility: HOSPITAL | Age: 60
End: 2025-03-02
Payer: COMMERCIAL

## 2025-03-02 DIAGNOSIS — G47.30 SLEEP APNEA, UNSPECIFIED TYPE: ICD-10-CM

## 2025-03-02 PROCEDURE — 95806 SLEEP STUDY UNATT&RESP EFFT: CPT | Performed by: PSYCHIATRY & NEUROLOGY

## 2025-03-11 ENCOUNTER — OFFICE VISIT (OUTPATIENT)
Dept: SLEEP MEDICINE | Facility: CLINIC | Age: 60
End: 2025-03-11
Payer: COMMERCIAL

## 2025-03-11 ASSESSMENT — PAIN SCALES - GENERAL: PAINLEVEL_OUTOF10: 0-NO PAIN

## 2025-03-11 NOTE — PROGRESS NOTES
"[facility] Sleep Medicine Clinic  Followup Visit Note    HISTORY OF PRESENT ILLNESS   Mayuri Cervantes is a 59 y.o. female who presents to a [facility] Sleep Medicine Clinic for followup.       Current History    On today's visit, the patient reports ***    PAP Adherence:      Sleep Scales:  ESS: 5     REVIEW OF SYSTEMS    All other systems negative      PHYSICAL EXAM     VITAL SIGNS: There were no vitals taken for this visit.     PREVIOUS WEIGHTS:  Wt Readings from Last 3 Encounters:   02/26/25 99.8 kg (220 lb)   01/14/25 102 kg (225 lb)   12/30/24 99.8 kg (220 lb)       Constitutional: Alert and oriented, cooperative, no obvious distress   HEENT: Non icteric or anemic, EOM WNL bilaterally   Neck: Supple, no JVD, no goiter, no adenopathy, no rigidity  Extremities: No clubbing, no LL edema   Neuromuscular: Cranial nerves grossly intact, no focal deficits     RESULTS/DATA     No results found for: \"IRON\", \"TRANSFERRIN\", \"IRONSAT\", \"TIBC\", \"FERRITIN\"      ASSESSMENT/PLAN     Ms. Cervantes is a 59 y.o. female and returns in followup for the following issues:    ***    "

## 2025-03-13 ENCOUNTER — TELEPHONE (OUTPATIENT)
Dept: SLEEP MEDICINE | Facility: CLINIC | Age: 60
End: 2025-03-13

## 2025-03-13 NOTE — TELEPHONE ENCOUNTER
[place] 313/25@11:36a to schedule a appointment to go over sleep study results with Barry   Holding RN to OR RN Unit RN to OR RN

## 2025-03-21 ENCOUNTER — APPOINTMENT (OUTPATIENT)
Dept: SLEEP MEDICINE | Facility: CLINIC | Age: 60
End: 2025-03-21
Payer: COMMERCIAL

## 2025-03-26 ENCOUNTER — APPOINTMENT (OUTPATIENT)
Dept: SLEEP MEDICINE | Facility: CLINIC | Age: 60
End: 2025-03-26
Payer: COMMERCIAL

## 2025-03-26 VITALS
HEIGHT: 63 IN | HEART RATE: 62 BPM | OXYGEN SATURATION: 93 % | DIASTOLIC BLOOD PRESSURE: 64 MMHG | BODY MASS INDEX: 38.09 KG/M2 | SYSTOLIC BLOOD PRESSURE: 110 MMHG | WEIGHT: 215 LBS

## 2025-03-26 DIAGNOSIS — G47.19 EXCESSIVE DAYTIME SLEEPINESS: ICD-10-CM

## 2025-03-26 DIAGNOSIS — G47.30 SLEEP APNEA, UNSPECIFIED TYPE: ICD-10-CM

## 2025-03-26 DIAGNOSIS — I10 BENIGN ESSENTIAL HYPERTENSION: ICD-10-CM

## 2025-03-26 PROCEDURE — 3074F SYST BP LT 130 MM HG: CPT | Performed by: PHYSICIAN ASSISTANT

## 2025-03-26 PROCEDURE — 1036F TOBACCO NON-USER: CPT | Performed by: PHYSICIAN ASSISTANT

## 2025-03-26 PROCEDURE — 3008F BODY MASS INDEX DOCD: CPT | Performed by: PHYSICIAN ASSISTANT

## 2025-03-26 PROCEDURE — 99214 OFFICE O/P EST MOD 30 MIN: CPT | Performed by: PHYSICIAN ASSISTANT

## 2025-03-26 PROCEDURE — 3078F DIAST BP <80 MM HG: CPT | Performed by: PHYSICIAN ASSISTANT

## 2025-03-26 ASSESSMENT — SLEEP AND FATIGUE QUESTIONNAIRES
HOW LIKELY ARE YOU TO NOD OFF OR FALL ASLEEP WHILE SITTING QUIETLY AFTER LUNCH WITHOUT ALCOHOL: WOULD NEVER DOZE
HOW LIKELY ARE YOU TO NOD OFF OR FALL ASLEEP WHILE LYING DOWN TO REST IN THE AFTERNOON WHEN CIRCUMSTANCES PERMIT: WOULD NEVER DOZE
HOW LIKELY ARE YOU TO NOD OFF OR FALL ASLEEP WHILE SITTING AND TALKING TO SOMEONE: WOULD NEVER DOZE
ESS-CHAD TOTAL SCORE: 6
HOW LIKELY ARE YOU TO NOD OFF OR FALL ASLEEP IN A CAR, WHILE STOPPED FOR A FEW MINUTES IN TRAFFIC: WOULD NEVER DOZE
HOW LIKELY ARE YOU TO NOD OFF OR FALL ASLEEP WHILE WATCHING TV: MODERATE CHANCE OF DOZING
HOW LIKELY ARE YOU TO NOD OFF OR FALL ASLEEP WHILE SITTING AND READING: SLIGHT CHANCE OF DOZING
HOW LIKELY ARE YOU TO NOD OFF OR FALL ASLEEP WHEN YOU ARE A PASSENGER IN A CAR FOR AN HOUR WITHOUT A BREAK: HIGH CHANCE OF DOZING
SITING INACTIVE IN A PUBLIC PLACE LIKE A CLASS ROOM OR A MOVIE THEATER: WOULD NEVER DOZE

## 2025-03-26 ASSESSMENT — PAIN SCALES - GENERAL: PAINLEVEL_OUTOF10: 0-NO PAIN

## 2025-03-26 NOTE — PATIENT INSTRUCTIONS
Great seeing you today,    CPAP data looks very good. We will order supplies for your CPAP.    Let us know if you have any issues with pressure, humidity, or mask.    We will plan on seeing you back in 1 year    Barry Alva PA-C    IMPORTANT PHONE NUMBERS     [phone])  [facility] [employer] [facility] (DME): [phone]  For clinical questions and refilling prescriptions: [phone]  Lisa Kaplan (For [place]): P: [phone]

## 2025-03-26 NOTE — PROGRESS NOTES
"[facility] Sleep Medicine Clinic  Followup Visit Note    HISTORY OF PRESENT ILLNESS   Mayuri Cervantes is a 59 y.o. female who presents to a [facility] Sleep Medicine Clinic for followup.       Current History    Her sleep study was consistent with severe sleep apnea with an AHI of 41.1 and SpO2 less than 88% for 14.7 minutes.    She continues to do well on CPAP. Thinks pressure could be higher. P30i mask is comfortable. Humidity is okay. She feels better on CPAP.    Sleep Scales:  ESS: 5     REVIEW OF SYSTEMS    All other systems negative      PHYSICAL EXAM     VITAL SIGNS: There were no vitals taken for this visit.     PREVIOUS WEIGHTS:  Wt Readings from Last 3 Encounters:   02/26/25 99.8 kg (220 lb)   01/14/25 102 kg (225 lb)   12/30/24 99.8 kg (220 lb)       Constitutional: Alert and oriented, cooperative, no obvious distress   HEENT: Non icteric or anemic, EOM WNL bilaterally  Extremities: no LL edema   Neuromuscular: Cranial nerves grossly intact, no focal deficits     RESULTS/DATA     No results found for: \"IRON\", \"TRANSFERRIN\", \"IRONSAT\", \"TIBC\", \"FERRITIN\"      ASSESSMENT/PLAN     Ms. Cervantes is a 59 y.o. female and returns in followup for the following issues:    OBSTRUCTIVE SLEEP APNEA  -Benefiting from CPAP  -Reviewed sleep study results - severe VEENA  -Great compliance, VEENA well controlled per download  -PC to 14  -supplies ordered [facility]    BMI>35  -Body mass index is 38.09 kg/m².  today  -With sufficient weight loss may no longer require treatment for VEENA    HYPERTENSION  BP Readings from Last 3 Encounters:   03/26/25 110/64   02/26/25 106/70   01/14/25 105/68      -Well controlled with current treatments     DELAYED SLEEP PHASE  -continue melatonin 1 mg 4-5 hours prior to intended bedtime    Followup 1 year    "

## 2025-04-02 ENCOUNTER — APPOINTMENT (OUTPATIENT)
Dept: PODIATRY | Facility: CLINIC | Age: 60
End: 2025-04-02
Payer: COMMERCIAL

## 2025-04-18 DIAGNOSIS — M19.90 ARTHRITIS: ICD-10-CM

## 2025-04-18 PROBLEM — N94.10 DYSPAREUNIA IN FEMALE: Status: RESOLVED | Noted: 2024-12-30 | Resolved: 2025-04-18

## 2025-04-18 RX ORDER — METHOCARBAMOL 500 MG/1
TABLET, FILM COATED ORAL
Qty: 30 TABLET | Refills: 0 | Status: SHIPPED | OUTPATIENT
Start: 2025-04-18

## 2025-04-25 DIAGNOSIS — M19.90 ARTHRITIS: Primary | ICD-10-CM

## 2025-04-25 DIAGNOSIS — G47.00 INSOMNIA, UNSPECIFIED TYPE: ICD-10-CM

## 2025-04-25 DIAGNOSIS — I10 BENIGN ESSENTIAL HYPERTENSION: ICD-10-CM

## 2025-04-25 RX ORDER — TRAZODONE HYDROCHLORIDE 50 MG/1
50 TABLET ORAL NIGHTLY
Qty: 30 TABLET | Refills: 1 | Status: SHIPPED | OUTPATIENT
Start: 2025-04-25

## 2025-04-25 RX ORDER — LOSARTAN POTASSIUM AND HYDROCHLOROTHIAZIDE 12.5; 1 MG/1; MG/1
1 TABLET ORAL DAILY
Qty: 30 TABLET | Refills: 1 | Status: SHIPPED | OUTPATIENT
Start: 2025-04-25

## 2025-04-25 RX ORDER — CELECOXIB 200 MG/1
200 CAPSULE ORAL
Qty: 30 CAPSULE | Refills: 1 | Status: SHIPPED | OUTPATIENT
Start: 2025-04-25

## 2025-05-24 DIAGNOSIS — R56.9 SEIZURE (MULTI): ICD-10-CM

## 2025-05-29 DIAGNOSIS — R56.9 SEIZURE (MULTI): ICD-10-CM

## 2025-05-29 RX ORDER — DIVALPROEX SODIUM 500 MG/1
500 TABLET, FILM COATED, EXTENDED RELEASE ORAL DAILY
Qty: 90 TABLET | Refills: 3 | Status: SHIPPED | OUTPATIENT
Start: 2025-05-29

## 2025-05-29 NOTE — TELEPHONE ENCOUNTER
Refill - [facility] - depakote  mg     Pt previously saw Dr Patton for this med. Abdi retired and told pt he PCP can prescribe.     Pt has 1  pill left    Lv 1/14/25  nv 7/16/25

## 2025-06-05 RX ORDER — DIVALPROEX SODIUM 500 MG/1
500 TABLET, FILM COATED, EXTENDED RELEASE ORAL DAILY
Qty: 30 TABLET | Refills: 0 | OUTPATIENT
Start: 2025-06-05

## 2025-06-05 NOTE — TELEPHONE ENCOUNTER
Tasqe message sent to previous patient of dr martins or dr pabon . Please see that encounter for further documentation.

## 2025-07-16 ENCOUNTER — APPOINTMENT (OUTPATIENT)
Dept: PRIMARY CARE | Facility: CLINIC | Age: 60
End: 2025-07-16
Payer: COMMERCIAL

## 2025-07-22 DIAGNOSIS — I10 BENIGN ESSENTIAL HYPERTENSION: ICD-10-CM

## 2025-07-23 RX ORDER — LOSARTAN POTASSIUM AND HYDROCHLOROTHIAZIDE 12.5; 1 MG/1; MG/1
1 TABLET ORAL DAILY
Qty: 30 TABLET | Refills: 0 | Status: SHIPPED | OUTPATIENT
Start: 2025-07-23

## 2025-08-13 PROBLEM — E78.1 PURE HYPERGLYCERIDEMIA: Status: ACTIVE | Noted: 2025-08-13

## 2025-08-13 PROBLEM — M13.0 POLYARTHRITIS: Status: ACTIVE | Noted: 2025-08-13

## 2025-08-13 PROBLEM — E78.2 MIXED HYPERLIPIDEMIA: Status: ACTIVE | Noted: 2025-08-13

## 2025-08-13 PROBLEM — E55.9 VITAMIN D DEFICIENCY: Status: ACTIVE | Noted: 2025-08-13

## 2025-08-13 PROBLEM — G93.32 CHRONIC FATIGUE SYNDROME: Status: ACTIVE | Noted: 2025-08-13

## 2025-08-13 PROBLEM — E66.812 CLASS 2 OBESITY: Status: ACTIVE | Noted: 2025-08-13

## 2025-08-13 PROBLEM — R26.9 ABNORMAL GAIT: Status: ACTIVE | Noted: 2025-08-13

## 2025-08-13 PROBLEM — G40.909 SEIZURE DISORDER (MULTI): Status: ACTIVE | Noted: 2024-12-30

## 2025-08-13 PROBLEM — E66.01 MORBID OBESITY (MULTI): Status: ACTIVE | Noted: 2025-08-13

## 2025-08-13 PROBLEM — M17.9 OSTEOARTHRITIS OF KNEE: Status: ACTIVE | Noted: 2025-08-13

## 2025-08-13 PROBLEM — G47.33 OBSTRUCTIVE SLEEP APNEA SYNDROME: Status: ACTIVE | Noted: 2025-08-13

## 2025-08-13 PROBLEM — G89.29 CHRONIC PAIN: Status: ACTIVE | Noted: 2025-08-13

## 2025-08-13 PROBLEM — M13.80 ALLERGIC ARTHRITIS: Status: ACTIVE | Noted: 2025-08-13

## 2025-08-17 LAB
ALBUMIN SERPL-MCNC: 4.5 G/DL (ref 3.6–5.1)
ALP SERPL-CCNC: 85 U/L (ref 37–153)
ALT SERPL-CCNC: 16 U/L (ref 6–29)
ANION GAP SERPL CALCULATED.4IONS-SCNC: 10 MMOL/L (CALC) (ref 7–17)
AST SERPL-CCNC: 16 U/L (ref 10–35)
BILIRUB SERPL-MCNC: 0.3 MG/DL (ref 0.2–1.2)
BUN SERPL-MCNC: 12 MG/DL (ref 7–25)
CALCIUM SERPL-MCNC: 9.6 MG/DL (ref 8.6–10.4)
CHLORIDE SERPL-SCNC: 103 MMOL/L (ref 98–110)
CHOLEST SERPL-MCNC: 178 MG/DL
CHOLEST/HDLC SERPL: 3.9 (CALC)
CO2 SERPL-SCNC: 26 MMOL/L (ref 20–32)
CREAT SERPL-MCNC: 0.84 MG/DL (ref 0.5–1.05)
EGFRCR SERPLBLD CKD-EPI 2021: 79 ML/MIN/1.73M2
ERYTHROCYTE [DISTWIDTH] IN BLOOD BY AUTOMATED COUNT: 12.5 % (ref 11–15)
EST. AVERAGE GLUCOSE BLD GHB EST-MCNC: 117 MG/DL
EST. AVERAGE GLUCOSE BLD GHB EST-SCNC: 6.5 MMOL/L
GLUCOSE SERPL-MCNC: 97 MG/DL (ref 65–99)
HBA1C MFR BLD: 5.7 %
HCT VFR BLD AUTO: 40.9 % (ref 35–45)
HDLC SERPL-MCNC: 46 MG/DL
HGB BLD-MCNC: 13.4 G/DL (ref 11.7–15.5)
LDLC SERPL CALC-MCNC: 100 MG/DL (CALC)
MCH RBC QN AUTO: 31.8 PG (ref 27–33)
MCHC RBC AUTO-ENTMCNC: 32.8 G/DL (ref 32–36)
MCV RBC AUTO: 96.9 FL (ref 80–100)
NONHDLC SERPL-MCNC: 132 MG/DL (CALC)
PLATELET # BLD AUTO: 256 THOUSAND/UL (ref 140–400)
PMV BLD REES-ECKER: 10.9 FL (ref 7.5–12.5)
POTASSIUM SERPL-SCNC: 4.3 MMOL/L (ref 3.5–5.3)
PROT SERPL-MCNC: 6.9 G/DL (ref 6.1–8.1)
RBC # BLD AUTO: 4.22 MILLION/UL (ref 3.8–5.1)
SODIUM SERPL-SCNC: 139 MMOL/L (ref 135–146)
TRIGL SERPL-MCNC: 196 MG/DL
TSH SERPL-ACNC: 1.09 MIU/L (ref 0.4–4.5)
WBC # BLD AUTO: 5.8 THOUSAND/UL (ref 3.8–10.8)

## 2025-08-17 ASSESSMENT — PROMIS GLOBAL HEALTH SCALE
RATE_PHYSICAL_HEALTH: FAIR
RATE_QUALITY_OF_LIFE: EXCELLENT
RATE_GENERAL_HEALTH: GOOD
RATE_SOCIAL_SATISFACTION: VERY GOOD
CARRYOUT_PHYSICAL_ACTIVITIES: MOSTLY
RATE_MENTAL_HEALTH: GOOD
EMOTIONAL_PROBLEMS: SOMETIMES
RATE_AVERAGE_PAIN: 10
RATE_AVERAGE_FATIGUE: MILD
CARRYOUT_SOCIAL_ACTIVITIES: VERY GOOD

## 2025-08-19 PROBLEM — E66.01 MORBID OBESITY (MULTI): Status: RESOLVED | Noted: 2025-08-13 | Resolved: 2025-08-19

## 2025-08-20 ENCOUNTER — APPOINTMENT (OUTPATIENT)
Dept: PRIMARY CARE | Facility: CLINIC | Age: 60
End: 2025-08-20
Payer: COMMERCIAL

## 2025-08-20 VITALS
SYSTOLIC BLOOD PRESSURE: 111 MMHG | HEART RATE: 55 BPM | WEIGHT: 222 LBS | DIASTOLIC BLOOD PRESSURE: 68 MMHG | OXYGEN SATURATION: 98 % | BODY MASS INDEX: 39.34 KG/M2 | TEMPERATURE: 97.8 F | HEIGHT: 63 IN

## 2025-08-20 DIAGNOSIS — G47.33 OBSTRUCTIVE SLEEP APNEA SYNDROME: ICD-10-CM

## 2025-08-20 DIAGNOSIS — I10 BENIGN ESSENTIAL HYPERTENSION: ICD-10-CM

## 2025-08-20 DIAGNOSIS — G47.00 INSOMNIA, UNSPECIFIED TYPE: ICD-10-CM

## 2025-08-20 DIAGNOSIS — M19.90 ARTHRITIS: ICD-10-CM

## 2025-08-20 DIAGNOSIS — M79.671 RIGHT FOOT PAIN: ICD-10-CM

## 2025-08-20 DIAGNOSIS — E55.9 VITAMIN D DEFICIENCY: ICD-10-CM

## 2025-08-20 DIAGNOSIS — R56.9 SEIZURE (MULTI): ICD-10-CM

## 2025-08-20 DIAGNOSIS — Z00.00 ANNUAL PHYSICAL EXAM: Primary | ICD-10-CM

## 2025-08-20 DIAGNOSIS — M79.672 PAIN OF LEFT HEEL: ICD-10-CM

## 2025-08-20 DIAGNOSIS — Z12.31 ENCOUNTER FOR SCREENING MAMMOGRAM FOR MALIGNANT NEOPLASM OF BREAST: ICD-10-CM

## 2025-08-20 DIAGNOSIS — E78.2 MIXED HYPERLIPIDEMIA: ICD-10-CM

## 2025-08-20 DIAGNOSIS — G40.909 SEIZURE DISORDER (MULTI): ICD-10-CM

## 2025-08-20 PROCEDURE — 3078F DIAST BP <80 MM HG: CPT | Performed by: NURSE PRACTITIONER

## 2025-08-20 PROCEDURE — 99214 OFFICE O/P EST MOD 30 MIN: CPT | Performed by: NURSE PRACTITIONER

## 2025-08-20 PROCEDURE — 3074F SYST BP LT 130 MM HG: CPT | Performed by: NURSE PRACTITIONER

## 2025-08-20 PROCEDURE — 3008F BODY MASS INDEX DOCD: CPT | Performed by: NURSE PRACTITIONER

## 2025-08-20 PROCEDURE — 99396 PREV VISIT EST AGE 40-64: CPT | Performed by: NURSE PRACTITIONER

## 2025-08-20 RX ORDER — LOSARTAN POTASSIUM AND HYDROCHLOROTHIAZIDE 12.5; 1 MG/1; MG/1
1 TABLET ORAL DAILY
Qty: 90 TABLET | Refills: 1 | Status: SHIPPED | OUTPATIENT
Start: 2025-08-20

## 2025-08-20 RX ORDER — TRAZODONE HYDROCHLORIDE 50 MG/1
50 TABLET ORAL NIGHTLY
Qty: 30 TABLET | Refills: 1 | Status: SHIPPED | OUTPATIENT
Start: 2025-08-20

## 2025-08-20 RX ORDER — METHOCARBAMOL 500 MG/1
500 TABLET, FILM COATED ORAL 4 TIMES DAILY
Qty: 120 TABLET | Refills: 1 | Status: SHIPPED | OUTPATIENT
Start: 2025-08-20

## 2025-08-20 RX ORDER — DIVALPROEX SODIUM 500 MG/1
500 TABLET, FILM COATED, EXTENDED RELEASE ORAL DAILY
Qty: 90 TABLET | Refills: 3 | Status: SHIPPED | OUTPATIENT
Start: 2025-08-20

## 2025-08-20 RX ORDER — METHYLPREDNISOLONE 4 MG/1
TABLET ORAL
Qty: 21 TABLET | Refills: 0 | Status: SHIPPED | OUTPATIENT
Start: 2025-08-20 | End: 2025-08-26

## 2025-08-20 ASSESSMENT — ENCOUNTER SYMPTOMS
CONSTITUTIONAL NEGATIVE: 1
RESPIRATORY NEGATIVE: 1
PSYCHIATRIC NEGATIVE: 1
ALLERGIC/IMMUNOLOGIC NEGATIVE: 1
NEUROLOGICAL NEGATIVE: 1
EYES NEGATIVE: 1
HEMATOLOGIC/LYMPHATIC NEGATIVE: 1
GASTROINTESTINAL NEGATIVE: 1
ENDOCRINE NEGATIVE: 1
CARDIOVASCULAR NEGATIVE: 1

## 2025-08-20 ASSESSMENT — PAIN SCALES - GENERAL: PAINLEVEL_OUTOF10: 10-WORST PAIN EVER

## 2025-08-29 DIAGNOSIS — M19.90 ARTHRITIS: Primary | ICD-10-CM

## 2025-08-29 RX ORDER — MELOXICAM 7.5 MG/1
7.5 TABLET ORAL DAILY
Qty: 90 TABLET | Refills: 1 | Status: SHIPPED | OUTPATIENT
Start: 2025-08-29 | End: 2026-08-29

## 2025-09-03 PROCEDURE — RXMED WILLOW AMBULATORY MEDICATION CHARGE

## 2025-09-04 ENCOUNTER — PHARMACY VISIT (OUTPATIENT)
Dept: PHARMACY | Facility: CLINIC | Age: 60
End: 2025-09-04
Payer: COMMERCIAL

## 2025-11-12 ENCOUNTER — APPOINTMENT (OUTPATIENT)
Dept: SLEEP MEDICINE | Facility: CLINIC | Age: 60
End: 2025-11-12
Payer: COMMERCIAL